# Patient Record
Sex: FEMALE | Race: WHITE | NOT HISPANIC OR LATINO
[De-identification: names, ages, dates, MRNs, and addresses within clinical notes are randomized per-mention and may not be internally consistent; named-entity substitution may affect disease eponyms.]

---
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
## 2020-01-03 ENCOUNTER — APPOINTMENT (OUTPATIENT)
Dept: OBGYN | Facility: CLINIC | Age: 20
End: 2020-01-03
Payer: COMMERCIAL

## 2020-01-03 VITALS — SYSTOLIC BLOOD PRESSURE: 110 MMHG | DIASTOLIC BLOOD PRESSURE: 70 MMHG

## 2020-01-03 PROCEDURE — 99213 OFFICE O/P EST LOW 20 MIN: CPT

## 2020-01-03 NOTE — CHIEF COMPLAINT
[Follow Up] : follow up GYN visit [FreeTextEntry1] : Pt here to discuss birth control. Had annual exam in August. She Was placed on ocp for ovarian cyst last year and had an allergic rxn to ocp.(Junel)\bonilla Saw gyn in August and was rx'd a different ocp but did not take because she was nervous about getting an allergic rxn.\bonilla Has multiple allergies to food.\bonilla Has painful menses, SA usues condoms.\par Also c/o pain in left breast, no lumps.

## 2020-01-03 NOTE — PHYSICAL EXAM
[Alert] : alert [Awake] : awake [Acute Distress] : no acute distress [Mass] : no breast mass [Axillary LAD] : no axillary lymphadenopathy [Nipple Discharge] : no nipple discharge [Soft] : soft [Tender] : non tender [Oriented x3] : oriented to person, place, and time [Normal] : uterus [No Bleeding] : there was no active vaginal bleeding [Tenderness] : nontender [Enlarged ___ wks] : not enlarged [Normal Position] : in a normal position [Uterine Adnexae] : were not tender and not enlarged [Adnexa Tenderness] : were not tender [Mass ___ cm] : no uterine mass was palpated [Ovarian Mass (___ Cm)] : there were no adnexal masses

## 2020-01-23 ENCOUNTER — TRANSCRIPTION ENCOUNTER (OUTPATIENT)
Age: 20
End: 2020-01-23

## 2020-01-28 NOTE — BEGINNING OF VISIT
Per provider verbal order, RN placed positive, negative controls, salmon, cod, tuna, corn and blue berry scratch tests on  Gian's back.  Consent was obtained prior to procedure.  Once panels were placed, patient was monitored for 15 minutes in clinic.  RN read test after 15 minutes and provider was notified of results.  Pt tolerated procedure well.  All questions and concerns were addressed at office visit.     Rakesh FRIED RN   Specialty Clinics    [Patient] : patient

## 2020-12-01 ENCOUNTER — TRANSCRIPTION ENCOUNTER (OUTPATIENT)
Age: 20
End: 2020-12-01

## 2021-08-19 ENCOUNTER — NON-APPOINTMENT (OUTPATIENT)
Age: 21
End: 2021-08-19

## 2021-08-19 ENCOUNTER — APPOINTMENT (OUTPATIENT)
Dept: OBGYN | Facility: CLINIC | Age: 21
End: 2021-08-19
Payer: MEDICARE

## 2021-08-19 VITALS
WEIGHT: 130 LBS | HEIGHT: 64 IN | BODY MASS INDEX: 22.2 KG/M2 | RESPIRATION RATE: 14 BRPM | DIASTOLIC BLOOD PRESSURE: 60 MMHG | SYSTOLIC BLOOD PRESSURE: 103 MMHG | HEART RATE: 74 BPM

## 2021-08-19 PROCEDURE — 99072 ADDL SUPL MATRL&STAF TM PHE: CPT

## 2021-08-19 PROCEDURE — 99395 PREV VISIT EST AGE 18-39: CPT

## 2021-08-19 NOTE — PLAN
[FreeTextEntry1] : PATIENT PRESENTS FOR ANNUAL VISIT. PATIENT DOING WELL.. SELF BREAST EXAM RECOMMENDED. DIET AND EXERCISE DISCUSSED. PATIENT ADVISED TO CONTACT WITH ANY CHANGES IN HEALTH. DISCUSSED CONTRACEPTION. WILL USE CONDOMS. SENIOR AT [facility]

## 2021-08-20 LAB
C TRACH RRNA SPEC QL NAA+PROBE: NOT DETECTED
N GONORRHOEA RRNA SPEC QL NAA+PROBE: NOT DETECTED
SOURCE TP AMPLIFICATION: NORMAL

## 2021-11-26 NOTE — HISTORY OF PRESENT ILLNESS
[Menarche ____ yo] : menarche at [unfilled] yo [Definite:  ___ (Date)] : the last menstrual period was [unfilled] [Menarche Age: ____] : age at menarche was [unfilled] [Sexually Active] : is sexually active [Monogamous] : is monogamous [Contraception] : uses contraception [Condoms] : uses condoms [Male ___] : [unfilled] male [Yes] : yes [Alert] : alert [No Acute Distress] : no acute distress [Normocephalic] : normocephalic [EOMI Bilateral] : EOMI bilateral [Clear tympanic membranes with bony landmarks and light reflex present bilaterally] : clear tympanic membranes with bony landmarks and light reflex present bilaterally  [Pink Nasal Mucosa] : pink nasal mucosa [Nonerythematous Oropharynx] : nonerythematous oropharynx [Supple, full passive range of motion] : supple, full passive range of motion [No Palpable Masses] : no palpable masses [Clear to Auscultation Bilaterally] : clear to auscultation bilaterally [Regular Rate and Rhythm] : regular rate and rhythm [Normal S1, S2 audible] : normal S1, S2 audible [No Murmurs] : no murmurs [+2 Femoral Pulses] : +2 femoral pulses [Soft] : soft [NonTender] : non tender [Non Distended] : non distended [Normoactive Bowel Sounds] : normoactive bowel sounds [No Hepatomegaly] : no hepatomegaly [No Splenomegaly] : no splenomegaly [Jameson: ____] : Jameson [unfilled] [Jameson: _____] : Jameson [unfilled] [No Abnormal Lymph Nodes Palpated] : no abnormal lymph nodes palpated [Normal Muscle Tone] : normal muscle tone [No Gait Asymmetry] : no gait asymmetry [No pain or deformities with palpation of bone, muscles, joints] : no pain or deformities with palpation of bone, muscles, joints [Straight] : straight [+2 Patella DTR] : +2 patella DTR [Cranial Nerves Grossly Intact] : cranial nerves grossly intact [No Rash or Lesions] : no rash or lesions [de-identified] : few papular lesions on feet and hands. small one inside nostril

## 2022-06-14 ENCOUNTER — APPOINTMENT (OUTPATIENT)
Dept: OBGYN | Facility: CLINIC | Age: 22
End: 2022-06-14

## 2022-06-14 VITALS
BODY MASS INDEX: 24.07 KG/M2 | WEIGHT: 141 LBS | SYSTOLIC BLOOD PRESSURE: 120 MMHG | DIASTOLIC BLOOD PRESSURE: 70 MMHG | HEIGHT: 64 IN

## 2022-06-14 DIAGNOSIS — Z30.9 ENCOUNTER FOR CONTRACEPTIVE MANAGEMENT, UNSPECIFIED: ICD-10-CM

## 2022-06-14 DIAGNOSIS — Z01.419 ENCOUNTER FOR GYNECOLOGICAL EXAMINATION (GENERAL) (ROUTINE) W/OUT ABNORMAL FINDINGS: ICD-10-CM

## 2022-06-14 DIAGNOSIS — Z30.09 ENCOUNTER FOR OTHER GENERAL COUNSELING AND ADVICE ON CONTRACEPTION: ICD-10-CM

## 2022-06-14 LAB
HCG UR QL: NEGATIVE
QUALITY CONTROL: YES

## 2022-06-14 PROCEDURE — 99072 ADDL SUPL MATRL&STAF TM PHE: CPT

## 2022-06-14 PROCEDURE — 99213 OFFICE O/P EST LOW 20 MIN: CPT

## 2022-06-14 PROCEDURE — 81025 URINE PREGNANCY TEST: CPT

## 2022-06-14 RX ORDER — EPINEPHRINE 0.3 MG/.3ML
0.3 INJECTION INTRAMUSCULAR
Qty: 2 | Refills: 0 | Status: DISCONTINUED | COMMUNITY
Start: 2019-07-22 | End: 2022-06-14

## 2022-06-14 RX ORDER — ALBUTEROL SULFATE 108 UG/1
108 (90 BASE) AEROSOL, METERED RESPIRATORY (INHALATION)
Qty: 7 | Refills: 0 | Status: DISCONTINUED | COMMUNITY
Start: 2019-03-22 | End: 2022-06-14

## 2022-06-14 RX ORDER — DESOGESTREL/ETHINYL ESTRADIOL AND ETHINYL ESTRADIOL 21-5 (28)
0.15-0.02/0.01 KIT ORAL DAILY
Qty: 3 | Refills: 3 | Status: DISCONTINUED | COMMUNITY
Start: 2019-08-17 | End: 2022-06-14

## 2022-06-14 RX ORDER — BETAMETHASONE DIPROPIONATE 0.5 MG/G
0.05 OINTMENT, AUGMENTED TOPICAL
Qty: 1 | Refills: 1 | Status: DISCONTINUED | COMMUNITY
Start: 2019-08-06 | End: 2022-06-14

## 2022-08-15 NOTE — DISCUSSION/SUMMARY
[FreeTextEntry1] : Benign breast exam. No lumps or masses felt. \par \par Contraceptive options were discussed including OCPs, Depo-Provera, NuvaRing, and IUD. She desires NuvaRing to try for now. R/b/a were discussed. Risks discussed include but are not limited to headache, mood swings, breast tenderness, weight gain, menstrual cycle changes, acne, ovarian cysts, and blood clots. ParaGard IUD pamphlet given. Prescription for NuvaRing given. Instructions discussed. \par \par She will follow up in three months for annual and BC check or as needed. \par \par

## 2022-08-15 NOTE — END OF VISIT
[FreeTextEntry3] : I, Cindy Skaggs solely acted as scribe for Dr. Brianna Hahn on 06/14/2022 \par All medical entries made by the Scribe were at my, Dr. Hahn’s, direction and personally\par dictated by me on 06/14/2022 . I have reviewed the chart and agree that the record\par accurately reflects my personal performance of the history, physical exam, assessment and plan. I\par have also personally directed, reviewed, and agreed with the chart.

## 2022-08-15 NOTE — HISTORY OF PRESENT ILLNESS
[Menarche Age: ____] : age at menarche was [unfilled] [No] : Patient does not have concerns regarding sex [N] : Patient does not use contraception [Y] : Patient is sexually active [Currently Active] : currently active [PGHxTotal] : 0 [LMPDate] : 5/23/22 [FreeTextEntry1] : 5/23/22

## 2022-08-25 ENCOUNTER — APPOINTMENT (OUTPATIENT)
Dept: OBGYN | Facility: CLINIC | Age: 22
End: 2022-08-25

## 2022-09-16 ENCOUNTER — NON-APPOINTMENT (OUTPATIENT)
Age: 22
End: 2022-09-16

## 2022-09-16 ENCOUNTER — APPOINTMENT (OUTPATIENT)
Dept: DERMATOLOGY | Facility: CLINIC | Age: 22
End: 2022-09-16

## 2022-09-16 VITALS — WEIGHT: 130 LBS | HEIGHT: 64 IN | BODY MASS INDEX: 22.2 KG/M2

## 2022-09-16 PROCEDURE — 99072 ADDL SUPL MATRL&STAF TM PHE: CPT

## 2022-09-16 PROCEDURE — 99204 OFFICE O/P NEW MOD 45 MIN: CPT

## 2022-09-16 RX ORDER — ETONOGESTREL AND ETHINYL ESTRADIOL 11.7; 2.7 MG/1; MG/1
0.12-0.015 INSERT, EXTENDED RELEASE VAGINAL
Qty: 3 | Refills: 0 | Status: DISCONTINUED | COMMUNITY
Start: 2022-06-14 | End: 2022-09-16

## 2022-09-16 NOTE — PHYSICAL EXAM
[FreeTextEntry3] : Erythematous scaling patches with inflammation\par b/l forearms, antecubitals\par also L upper eyelid

## 2022-09-16 NOTE — ASSESSMENT
[FreeTextEntry1] : Atopic Derm; exacerbated by work environment recently; \par \par Therapeutic options and their risks and benefits; along with multiple diagnostic possibilities were discussed at length; risks and benefits of further study were discussed;\par \par betamethasone AF cream BID;  1-2 weeks (sparingly on eyelids)

## 2022-09-16 NOTE — HISTORY OF PRESENT ILLNESS
[de-identified] : pt. c/o itching on arms;  worse last few weeks;\par has Hx of eczema in childhood\par used TAC ointment in the past;

## 2022-10-05 ENCOUNTER — NON-APPOINTMENT (OUTPATIENT)
Age: 22
End: 2022-10-05

## 2022-10-05 ENCOUNTER — APPOINTMENT (OUTPATIENT)
Dept: OBGYN | Facility: CLINIC | Age: 22
End: 2022-10-05

## 2022-10-05 VITALS
HEIGHT: 64 IN | DIASTOLIC BLOOD PRESSURE: 60 MMHG | BODY MASS INDEX: 21.34 KG/M2 | SYSTOLIC BLOOD PRESSURE: 100 MMHG | WEIGHT: 125 LBS

## 2022-10-05 DIAGNOSIS — Z83.3 FAMILY HISTORY OF DIABETES MELLITUS: ICD-10-CM

## 2022-10-05 PROCEDURE — 99395 PREV VISIT EST AGE 18-39: CPT

## 2022-10-05 PROCEDURE — 99072 ADDL SUPL MATRL&STAF TM PHE: CPT

## 2022-10-09 NOTE — END OF VISIT
[FreeTextEntry3] : I, Cindy Skaggs solely acted as scribe for Dr. Brianna Hahn on 10/05/2022 \par All medical entries made by the Scribe were at my, Dr. Hahn’s, direction and personally\par dictated by me on 10/05/2022 . I have reviewed the chart and agree that the record\par accurately reflects my personal performance of the history, physical exam, assessment and plan. I\par have also personally directed, reviewed, and agreed with the chart.

## 2022-10-09 NOTE — HISTORY OF PRESENT ILLNESS
[Gonorrhea test offered] : Gonorrhea test offered [Chlamydia test offered] : Chlamydia test offered [Condoms] : uses condoms [Y] : Patient is sexually active [N] : Patient denies prior pregnancies [Menarche Age: ____] : age at menarche was [unfilled] [Currently Active] : currently active [Men] : men [PapSmeardate] : 08/19/2021 [TextBox_31] : Negative [GonorrheaDate] : 06/14/2022 [TextBox_63] : Negative [ChlamydiaDate] : 06/14/2022 [TextBox_68] : Negative [LMPDate] : 09/09/2022 [FreeTextEntry1] : 09/09/2022

## 2022-10-09 NOTE — PHYSICAL EXAM
[Chaperone Present] : A chaperone was present in the examining room during all aspects of the physical examination [Appropriately responsive] : appropriately responsive [Alert] : alert [No Acute Distress] : no acute distress [Soft] : soft [Non-tender] : non-tender [Non-distended] : non-distended [Oriented x3] : oriented x3 [Examination Of The Breasts] : a normal appearance [No Discharge] : no discharge [No Masses] : no breast masses were palpable [Labia Majora] : normal [Labia Minora] : normal [No Bleeding] : There was no active vaginal bleeding [Normal] : normal [Uterine Adnexae] : normal [FreeTextEntry1] : MOA: Nicole NAQVI

## 2022-10-10 LAB
C TRACH RRNA SPEC QL NAA+PROBE: NOT DETECTED
CYTOLOGY CVX/VAG DOC THIN PREP: NORMAL
HPV HIGH+LOW RISK DNA PNL CVX: NOT DETECTED
N GONORRHOEA RRNA SPEC QL NAA+PROBE: NOT DETECTED
SOURCE AMPLIFICATION: NORMAL
SOURCE TP AMPLIFICATION: NORMAL
T VAGINALIS RRNA SPEC QL NAA+PROBE: NOT DETECTED

## 2022-10-15 ENCOUNTER — APPOINTMENT (OUTPATIENT)
Dept: OBGYN | Facility: CLINIC | Age: 22
End: 2022-10-15

## 2023-02-23 ENCOUNTER — APPOINTMENT (OUTPATIENT)
Dept: DERMATOLOGY | Facility: CLINIC | Age: 23
End: 2023-02-23
Payer: COMMERCIAL

## 2023-02-23 PROCEDURE — 99072 ADDL SUPL MATRL&STAF TM PHE: CPT

## 2023-02-23 PROCEDURE — 99213 OFFICE O/P EST LOW 20 MIN: CPT

## 2023-02-23 NOTE — ASSESSMENT
[FreeTextEntry1] : Cyst;  possible PMX\par small, no suspicious features; \par \par Therapeutic options and their risks and benefits; along with multiple diagnostic possibilities were discussed at length; risks and benefits of further study were discussed;\par \par nature explained, No treatment is required unless this lesion becomes symptomatic.

## 2023-02-23 NOTE — PHYSICAL EXAM
[FreeTextEntry3] : Mobile subcutaneous nodule with normal overlying skin and a connecting pore \par Right posterior shoulder; \par non-tender; firm

## 2023-02-23 NOTE — HISTORY OF PRESENT ILLNESS
[de-identified] : The patient has been fit in for urgent appointment.\par c/o lesion on back;  present approx 1 year;  no Sxs;

## 2023-03-02 ENCOUNTER — APPOINTMENT (OUTPATIENT)
Dept: OBGYN | Facility: CLINIC | Age: 23
End: 2023-03-02
Payer: COMMERCIAL

## 2023-03-02 VITALS
BODY MASS INDEX: 23.73 KG/M2 | WEIGHT: 139 LBS | SYSTOLIC BLOOD PRESSURE: 102 MMHG | DIASTOLIC BLOOD PRESSURE: 60 MMHG | HEIGHT: 64 IN

## 2023-03-02 LAB
BILIRUB UR QL STRIP: NORMAL
GLUCOSE UR-MCNC: NORMAL
HCG UR QL: 0.2 EU/DL
HCG UR QL: NEGATIVE
HGB UR QL STRIP.AUTO: NORMAL
KETONES UR-MCNC: NORMAL
LEUKOCYTE ESTERASE UR QL STRIP: NORMAL
NITRITE UR QL STRIP: NORMAL
PH UR STRIP: 6
PROT UR STRIP-MCNC: NORMAL
QUALITY CONTROL: YES
SP GR UR STRIP: 1.01

## 2023-03-02 PROCEDURE — 99072 ADDL SUPL MATRL&STAF TM PHE: CPT

## 2023-03-02 PROCEDURE — 99213 OFFICE O/P EST LOW 20 MIN: CPT

## 2023-03-02 PROCEDURE — 81025 URINE PREGNANCY TEST: CPT

## 2023-03-02 PROCEDURE — 81003 URINALYSIS AUTO W/O SCOPE: CPT | Mod: QW

## 2023-03-02 NOTE — PHYSICAL EXAM
[Chaperone Present] : A chaperone was present in the examining room during all aspects of the physical examination [Appropriately responsive] : appropriately responsive [Alert] : alert [No Acute Distress] : no acute distress [Soft] : soft [Non-tender] : non-tender [Non-distended] : non-distended [No Mass] : no mass [Oriented x3] : oriented x3 [Labia Majora] : normal [Labia Minora] : normal [Normal] : normal [Uterine Adnexae] : non-palpable [FreeTextEntry1] : GRICELDA Rodrigues  [FreeTextEntry7] : no CVAT [Tenderness] : nontender [FreeTextEntry5] : Very scant blood at the os, no CMT

## 2023-03-02 NOTE — HISTORY OF PRESENT ILLNESS
[Condoms] : uses condoms [Y] : Patient is sexually active [N] : Patient denies prior pregnancies [Menarche Age: ____] : age at menarche was [unfilled] [Currently Active] : currently active [No] : Patient does not have concerns regarding sex [PapSmeardate] : 10/05/22 [TextBox_31] : NEG [GonorrheaDate] : 10/05/22 [TextBox_63] : NEG [ChlamydiaDate] : 10/05/22 [TextBox_68] : NEG [TrichomonasDate] : 10/05/22 [TextBox_73] : NEG [HPVDate] : 10/05/22 [TextBox_78] : NEG [LMPDate] : 2/21/23 [PGHxTotal] : 0 [FreeTextEntry1] : 2/21/23

## 2023-03-02 NOTE — PLAN
[FreeTextEntry1] : -We reviewed her benign exam today, no reproducible pain \par -F/u GC/chlamydia culture\par -Monitor symptoms, if discomfort or spotting persists, return for TVUS next week\par -If worsening pain, heavy bleeding, N/V, fever/chills, she is aware she needs ER evaluation \par -Call or RTO PRN for any new problems, questions or concerns

## 2023-03-07 ENCOUNTER — ASOB RESULT (OUTPATIENT)
Age: 23
End: 2023-03-07

## 2023-03-07 ENCOUNTER — APPOINTMENT (OUTPATIENT)
Dept: OBGYN | Facility: CLINIC | Age: 23
End: 2023-03-07
Payer: MEDICARE

## 2023-03-07 ENCOUNTER — APPOINTMENT (OUTPATIENT)
Dept: ANTEPARTUM | Facility: CLINIC | Age: 23
End: 2023-03-07
Payer: COMMERCIAL

## 2023-03-07 VITALS
WEIGHT: 139 LBS | HEIGHT: 64 IN | SYSTOLIC BLOOD PRESSURE: 108 MMHG | TEMPERATURE: 97 F | DIASTOLIC BLOOD PRESSURE: 64 MMHG | BODY MASS INDEX: 23.73 KG/M2

## 2023-03-07 PROCEDURE — 76830 TRANSVAGINAL US NON-OB: CPT

## 2023-03-07 PROCEDURE — 99072 ADDL SUPL MATRL&STAF TM PHE: CPT

## 2023-03-07 PROCEDURE — 99212 OFFICE O/P EST SF 10 MIN: CPT

## 2023-03-10 ENCOUNTER — TRANSCRIPTION ENCOUNTER (OUTPATIENT)
Age: 23
End: 2023-03-10

## 2023-03-10 ENCOUNTER — NON-APPOINTMENT (OUTPATIENT)
Age: 23
End: 2023-03-10

## 2023-03-22 ENCOUNTER — APPOINTMENT (OUTPATIENT)
Dept: MRI IMAGING | Facility: CLINIC | Age: 23
End: 2023-03-22
Payer: COMMERCIAL

## 2023-03-22 ENCOUNTER — OUTPATIENT (OUTPATIENT)
Dept: OUTPATIENT SERVICES | Facility: HOSPITAL | Age: 23
LOS: 1 days | End: 2023-03-22
Payer: MEDICARE

## 2023-03-22 DIAGNOSIS — Q51.4 UNICORNATE UTERUS: ICD-10-CM

## 2023-03-22 PROCEDURE — 72197 MRI PELVIS W/O & W/DYE: CPT

## 2023-03-22 PROCEDURE — 72197 MRI PELVIS W/O & W/DYE: CPT | Mod: 26

## 2023-03-22 PROCEDURE — A9585: CPT

## 2023-03-24 ENCOUNTER — APPOINTMENT (OUTPATIENT)
Dept: OBGYN | Facility: CLINIC | Age: 23
End: 2023-03-24
Payer: MEDICARE

## 2023-03-24 VITALS
HEIGHT: 64 IN | SYSTOLIC BLOOD PRESSURE: 104 MMHG | BODY MASS INDEX: 23.79 KG/M2 | WEIGHT: 139.38 LBS | DIASTOLIC BLOOD PRESSURE: 66 MMHG

## 2023-03-24 PROCEDURE — 99214 OFFICE O/P EST MOD 30 MIN: CPT

## 2023-03-26 NOTE — DISCUSSION/SUMMARY
[FreeTextEntry1] : 21 y/o G0 LMP 3/10 presenting for episode of irregular menses, hx of ovarian cysts, and concern for unicornuate uterus. MRI on 3/22 showed normal uterus with no mullerian anomalies, no ovarian cysts.\par - Counseled that one episode of light bleeding a few days before menses can occur especially in context of cysts and can continue to be monitored, will likely not occur again. Advised if it is recurrent, to call office for follow up and further work up.\par - Counseled on MRI result sand images reviewed with patient. Advised uterus is normal and there are no current ovarian cysts. Patient had concern for future pregnancies, advised that given uterus is normal there would be no increased concern or risks at this time.\par - Reviewed possible allergy to Junel. Reviewed when it comes to cysts, combined OCPs, patches, or vaginal ring are only options that help prevent them. Extensively discussed different options, and differences in E2 dosing and types of progesterone. ADvised it may be possible  that a different pill would work better for her and not cause a reaction, but that continuing to observe without pills as cysts come and go, managing pain with ibuprofen as needed, is also an option. Patient choosing to continue to observe at this time.\par - Return for routine visits or as needed, no further intervention at this time.\par \par Pietro Mary MD

## 2023-03-26 NOTE — DISCUSSION/SUMMARY
[FreeTextEntry1] : Pt to RTO for f/u TVS in 9-12 weeks, f/u ovarian cyst sooner if needed.  Differentials regarding possible unicornuate uterus reviewed - discussed not likely due to bilateral ovaries, tubes look normal.  All the pt's questions and concerns were addressed,

## 2023-03-26 NOTE — HISTORY OF PRESENT ILLNESS
[Condoms] : uses condoms [Y] : Patient is sexually active [N] : Patient denies prior pregnancies [Menarche Age: ____] : age at menarche was [unfilled] [No] : Patient does not have concerns regarding sex [Currently Active] : currently active [PapSmeardate] : 10/05/22 [GonorrheaDate] : 03/02/23 [TextBox_31] : NEG [TextBox_63] : NEG [TextBox_68] : NEG [ChlamydiaDate] : 03/02/23 [TrichomonasDate] : 10/05/22 [TextBox_73] : NEG [HPVDate] : 10/05/22 [TextBox_78] : NEG [LMPDate] : 02/21/23 [PGHxTotal] : 0 [FreeTextEntry1] : 02/21/23

## 2023-03-26 NOTE — HISTORY OF PRESENT ILLNESS
[N] : Patient denies prior pregnancies [Menarche Age: ____] : age at menarche was [unfilled] [No] : Patient does not have concerns regarding sex [PapSmeardate] : 10/05/22 [TextBox_31] : NEG [BoneDensityDate] : N [GonorrheaDate] : 03/02/23 [TextBox_63] : NEG [ChlamydiaDate] : 03/02/23 [TextBox_68] : NEG [LMPDate] : 03/10/23 [PGHxTotal] : 0 [FreeTextEntry1] : 03/10/23

## 2023-03-27 ENCOUNTER — NON-APPOINTMENT (OUTPATIENT)
Age: 23
End: 2023-03-27

## 2023-04-18 ENCOUNTER — OUTPATIENT (OUTPATIENT)
Dept: OUTPATIENT SERVICES | Facility: HOSPITAL | Age: 23
LOS: 1 days | End: 2023-04-18
Payer: MEDICARE

## 2023-04-18 ENCOUNTER — APPOINTMENT (OUTPATIENT)
Dept: RADIOLOGY | Facility: HOSPITAL | Age: 23
End: 2023-04-18

## 2023-04-18 DIAGNOSIS — Q51.4 UNICORNATE UTERUS: ICD-10-CM

## 2023-04-18 PROCEDURE — 58340 CATHETER FOR HYSTEROGRAPHY: CPT

## 2023-04-18 PROCEDURE — 74740 X-RAY FEMALE GENITAL TRACT: CPT | Mod: 26

## 2023-04-18 PROCEDURE — 74740 X-RAY FEMALE GENITAL TRACT: CPT

## 2023-04-19 ENCOUNTER — TRANSCRIPTION ENCOUNTER (OUTPATIENT)
Age: 23
End: 2023-04-19

## 2023-04-21 ENCOUNTER — TRANSCRIPTION ENCOUNTER (OUTPATIENT)
Age: 23
End: 2023-04-21

## 2023-04-24 ENCOUNTER — TRANSCRIPTION ENCOUNTER (OUTPATIENT)
Age: 23
End: 2023-04-24

## 2023-04-24 DIAGNOSIS — R30.0 DYSURIA: ICD-10-CM

## 2023-05-23 ENCOUNTER — APPOINTMENT (OUTPATIENT)
Dept: DERMATOLOGY | Facility: CLINIC | Age: 23
End: 2023-05-23
Payer: SELF-PAY

## 2023-05-23 PROCEDURE — D0090: CPT

## 2023-05-30 ENCOUNTER — APPOINTMENT (OUTPATIENT)
Dept: ANTEPARTUM | Facility: CLINIC | Age: 23
End: 2023-05-30

## 2023-06-29 ENCOUNTER — APPOINTMENT (OUTPATIENT)
Dept: MATERNAL FETAL MEDICINE | Facility: CLINIC | Age: 23
End: 2023-06-29
Payer: SELF-PAY

## 2023-06-29 ENCOUNTER — APPOINTMENT (OUTPATIENT)
Dept: ANTEPARTUM | Facility: CLINIC | Age: 23
End: 2023-06-29

## 2023-06-29 ENCOUNTER — NON-APPOINTMENT (OUTPATIENT)
Age: 23
End: 2023-06-29

## 2023-06-29 VITALS
HEART RATE: 69 BPM | SYSTOLIC BLOOD PRESSURE: 102 MMHG | BODY MASS INDEX: 24.24 KG/M2 | RESPIRATION RATE: 18 BRPM | OXYGEN SATURATION: 98 % | DIASTOLIC BLOOD PRESSURE: 80 MMHG | WEIGHT: 142 LBS | HEIGHT: 64 IN

## 2023-06-29 DIAGNOSIS — Q51.4 UNICORNATE UTERUS: ICD-10-CM

## 2023-06-29 DIAGNOSIS — Z87.09 PERSONAL HISTORY OF OTHER DISEASES OF THE RESPIRATORY SYSTEM: ICD-10-CM

## 2023-06-29 PROCEDURE — 99204 OFFICE O/P NEW MOD 45 MIN: CPT

## 2023-06-29 RX ORDER — BETAMETHASONE DIPROPIONATE 0.5 MG/G
0.05 CREAM, AUGMENTED TOPICAL
Qty: 1 | Refills: 3 | Status: DISCONTINUED | COMMUNITY
Start: 2022-09-16 | End: 2023-06-29

## 2023-06-29 NOTE — HISTORY OF PRESENT ILLNESS
[FreeTextEntry1] : Marina Richard is a 22 y.o. G0 who presents for preconception consultation secondary to newly diagnosed unicornuate uterus.  Her BMI is 24.37 kg/m2.  Marina had pelvis ultrasound performed on 3/7/23 secondary to complaint of pelvic pain.  Right ovarian cysts were noted and a unicornuate uterus was suspected.  She was sent for MRI for further evaluation which reported a unicornuate uterus without renal anomy.  HSG confirmed a unicornuate uterus with patent left fallopian tube.  Marina does not have any plan for conception at this time.  She presents today with her mother to discuss how this may affect a future pregnancy.  On the day of consultation, she feels well and report on constitutional complaint.

## 2023-06-29 NOTE — DATA REVIEWED
[FreeTextEntry1] : TAUS / TVUS 3/7/2 - homogenous uterine myometrium, suspected unicornuate uterus, endometrial lining 6.1 mm, left ovary sonographically normal, right ovary with 4.4 x 2.9 x 3.6 cm hemorrhagic cyst and multiple additional simple cysts.\par \par MRI 3/22/23 - unicornuate uterus, no renal anomaly\par \par HSG 4/18/23 - small unicornuate uterus with patent left fallopian tube, no filling defect noted

## 2023-06-29 NOTE — DISCUSSION/SUMMARY
[FreeTextEntry1] : At the time of consultation, we reviewed that congenital anomalies of the uterus are often asymptomatic and therefore unrecognized.  It is fortunate the diagnosis of unicornuate uterus was made prior to pregnancy so that potential complications may be addressed and managed. \par \par A  unicornuate uterus occurs when the two mullerian ducts fail to fuse symmetrically.  One cavity is usually normal, with a fallopian tube and cervix, while the failed mullerian duct has various configurations.   Unicornuate uteri have been reported to occur in approximately 1:4000 women in the general population; however, it is more commonly diagnosed in women with a history of infertility.  Renal anomalies have been reported in up to 40% women with unicornuate uteri; therefore, all women with mullerian defects should undergo a radiologic renal investigation. Marina was reassured that both kidneys were visualized and appeared normal on MRI.  \par \par Obstetrical complications reported to occur more commonly with uterine anomalies include increased risks of miscarriage (25% first trimester, 10% second trimester), prematurity (20%), fetal growth restriction, stillbirth (10%), antepartum and postpartum bleeding, cervical insufficiency, abnormal fetal presentation, hypertensive disorder of pregnancy, and  delivery.  Growth restriction may be related to a small uterine cavity or abnormal vascularization of the uterus, with resultant uteroplacental insufficiency.  The increased risk of malpresentation is the main reason for the increased risk of  delivery.  Hypertensive disorders have been attributed to coexistent congenital renal abnormalities.   The live birth rate in women with unicornuate uteri is reported as 50%. For completion, we discussed the option of IVF with gestational carrier.  All questions were answered to the best of my ability. \par \par At the time of future pregnancy, I would recommend early ultrasound confirmation of IUP, with baseline cervical length evaluation at 16 weeks and continued serial ultrasound surveillance of cervical length.  I would also recommend serial ultrasound evaluation of fetal growth and careful monitoring of signs/symptoms of preeclampsia and  labor. Timing and route of delivery will be dependent on clinical course and results of antepartum evaluation. \par \par While Marina has not plan to conceive in the near future, we reviewed general preconception recommendations. She was advised to begin prenatal vitamin supplementation with at least 400 mcg of folic acid in the 2-3 months prior to attempt at conception. She was counseled to avoid toxic exposures (smoke, alcohol, infections) and make sure vaccinations and health maintenance examinations are up to date.  She was offered genetic consultation for carrier screening and declines evaluation until her timeline for family planning is more definitive. Healthy diet and lifestyle recommendations were reviewed.

## 2023-06-29 NOTE — FAMILY HISTORY
[Age 35+ During Pregnancy] : not 35 or over during pregnancy [Reported Family History Of Birth Defects] : no congenital heart defects [Kye-Sachs Carrier] : no Kye-Sachs [Family History] : no mental retardation/autism [Reported Family History Of Genetic Disease] : no history of child defect in child of baby father

## 2023-09-12 ENCOUNTER — NON-APPOINTMENT (OUTPATIENT)
Age: 23
End: 2023-09-12

## 2023-09-12 ENCOUNTER — APPOINTMENT (OUTPATIENT)
Dept: ULTRASOUND IMAGING | Facility: CLINIC | Age: 23
End: 2023-09-12
Payer: MEDICARE

## 2023-09-12 PROCEDURE — 76536 US EXAM OF HEAD AND NECK: CPT | Mod: 26

## 2023-09-18 ENCOUNTER — APPOINTMENT (OUTPATIENT)
Dept: ORTHOPEDIC SURGERY | Facility: CLINIC | Age: 23
End: 2023-09-18

## 2023-10-05 ENCOUNTER — NON-APPOINTMENT (OUTPATIENT)
Age: 23
End: 2023-10-05

## 2023-10-05 ENCOUNTER — APPOINTMENT (OUTPATIENT)
Dept: PEDIATRIC ALLERGY IMMUNOLOGY | Facility: CLINIC | Age: 23
End: 2023-10-05
Payer: MEDICARE

## 2023-10-05 VITALS
BODY MASS INDEX: 23.05 KG/M2 | WEIGHT: 135 LBS | OXYGEN SATURATION: 97 % | DIASTOLIC BLOOD PRESSURE: 69 MMHG | TEMPERATURE: 97.9 F | HEART RATE: 82 BPM | HEIGHT: 64 IN | SYSTOLIC BLOOD PRESSURE: 113 MMHG

## 2023-10-05 DIAGNOSIS — R79.89 OTHER SPECIFIED ABNORMAL FINDINGS OF BLOOD CHEMISTRY: ICD-10-CM

## 2023-10-05 DIAGNOSIS — Z91.010 ALLERGY TO PEANUTS: ICD-10-CM

## 2023-10-05 DIAGNOSIS — J45.990 EXERCISE INDUCED BRONCHOSPASM: ICD-10-CM

## 2023-10-05 DIAGNOSIS — Z82.5 FAMILY HISTORY OF ASTHMA AND OTHER CHRONIC LOWER RESPIRATORY DISEASES: ICD-10-CM

## 2023-10-05 DIAGNOSIS — L20.9 ATOPIC DERMATITIS, UNSPECIFIED: ICD-10-CM

## 2023-10-05 DIAGNOSIS — Z91.013 ALLERGY TO SEAFOOD: ICD-10-CM

## 2023-10-05 DIAGNOSIS — T78.05XA ANAPHYLACTIC REACTION DUE TO TREE NUTS AND SEEDS, INITIAL ENCOUNTER: ICD-10-CM

## 2023-10-05 PROCEDURE — 99204 OFFICE O/P NEW MOD 45 MIN: CPT | Mod: 25

## 2023-10-05 PROCEDURE — 94010 BREATHING CAPACITY TEST: CPT

## 2023-10-05 RX ORDER — EPINEPHRINE 0.3 MG/.3ML
0.3 INJECTION INTRAMUSCULAR
Qty: 4 | Refills: 2 | Status: ACTIVE | COMMUNITY
Start: 2023-10-05 | End: 1900-01-01

## 2023-10-12 ENCOUNTER — NON-APPOINTMENT (OUTPATIENT)
Age: 23
End: 2023-10-12

## 2023-10-18 ENCOUNTER — APPOINTMENT (OUTPATIENT)
Dept: PEDIATRIC ALLERGY IMMUNOLOGY | Facility: CLINIC | Age: 23
End: 2023-10-18
Payer: SELF-PAY

## 2023-10-18 VITALS
SYSTOLIC BLOOD PRESSURE: 120 MMHG | DIASTOLIC BLOOD PRESSURE: 82 MMHG | OXYGEN SATURATION: 100 % | HEART RATE: 64 BPM | TEMPERATURE: 98.2 F

## 2023-10-18 DIAGNOSIS — T78.05XD ANAPHYLACTIC REACTION DUE TO TREE NUTS AND SEEDS, SUBSEQUENT ENCOUNTER: ICD-10-CM

## 2023-10-18 DIAGNOSIS — Z91.013 ALLERGY TO SEAFOOD: ICD-10-CM

## 2023-10-18 DIAGNOSIS — J45.20 MILD INTERMITTENT ASTHMA, UNCOMPLICATED: ICD-10-CM

## 2023-10-18 PROCEDURE — 99213 OFFICE O/P EST LOW 20 MIN: CPT | Mod: 25

## 2023-10-18 PROCEDURE — 95004 PERQ TESTS W/ALRGNC XTRCS: CPT

## 2023-11-28 ENCOUNTER — APPOINTMENT (OUTPATIENT)
Dept: DERMATOLOGY | Facility: CLINIC | Age: 23
End: 2023-11-28
Payer: SELF-PAY

## 2023-11-28 PROCEDURE — D0090: CPT

## 2024-03-07 ENCOUNTER — APPOINTMENT (OUTPATIENT)
Dept: OBGYN | Facility: CLINIC | Age: 24
End: 2024-03-07
Payer: COMMERCIAL

## 2024-03-07 VITALS
SYSTOLIC BLOOD PRESSURE: 112 MMHG | DIASTOLIC BLOOD PRESSURE: 68 MMHG | BODY MASS INDEX: 23.05 KG/M2 | HEIGHT: 64 IN | WEIGHT: 135 LBS | TEMPERATURE: 98 F

## 2024-03-07 DIAGNOSIS — Z01.419 ENCOUNTER FOR GYNECOLOGICAL EXAMINATION (GENERAL) (ROUTINE) W/OUT ABNORMAL FINDINGS: ICD-10-CM

## 2024-03-07 DIAGNOSIS — Z11.8 ENCOUNTER FOR SCREENING FOR OTHER INFECTIOUS AND PARASITIC DISEASES: ICD-10-CM

## 2024-03-07 DIAGNOSIS — Z87.42 PERSONAL HISTORY OF OTHER DISEASES OF THE FEMALE GENITAL TRACT: ICD-10-CM

## 2024-03-07 DIAGNOSIS — N83.201 UNSPECIFIED OVARIAN CYST, RIGHT SIDE: ICD-10-CM

## 2024-03-07 DIAGNOSIS — Z31.69 ENCOUNTER FOR OTHER GENERAL COUNSELING AND ADVICE ON PROCREATION: ICD-10-CM

## 2024-03-07 DIAGNOSIS — Z41.1 ENCOUNTER FOR COSMETIC SURGERY: ICD-10-CM

## 2024-03-07 DIAGNOSIS — Z91.018 ALLERGY TO OTHER FOODS: ICD-10-CM

## 2024-03-07 DIAGNOSIS — N64.4 MASTODYNIA: ICD-10-CM

## 2024-03-07 DIAGNOSIS — Z11.3 ENCOUNTER FOR SCREENING FOR INFECTIONS WITH A PREDOMINANTLY SEXUAL MODE OF TRANSMISSION: ICD-10-CM

## 2024-03-07 DIAGNOSIS — J30.81 ALLERGIC RHINITIS DUE TO ANIMAL (CAT) (DOG) HAIR AND DANDER: ICD-10-CM

## 2024-03-07 DIAGNOSIS — Z87.2 PERSONAL HISTORY OF DISEASES OF THE SKIN AND SUBCUTANEOUS TISSUE: ICD-10-CM

## 2024-03-07 LAB
HCG UR QL: NEGATIVE
QUALITY CONTROL: YES

## 2024-03-07 PROCEDURE — 81025 URINE PREGNANCY TEST: CPT

## 2024-03-07 PROCEDURE — 99395 PREV VISIT EST AGE 18-39: CPT

## 2024-03-07 NOTE — PHYSICAL EXAM
[Chaperone Present] : A chaperone was present in the examining room during all aspects of the physical examination [Appropriately responsive] : appropriately responsive [Alert] : alert [No Acute Distress] : no acute distress [Soft] : soft [Non-tender] : non-tender [Non-distended] : non-distended [Oriented x3] : oriented x3 [Examination Of The Breasts] : a normal appearance [No Discharge] : no discharge [No Masses] : no breast masses were palpable [Labia Majora] : normal [Labia Minora] : normal [Uterine Adnexae] : normal [Normal] : normal

## 2024-03-07 NOTE — COUNSELING
[Breast Self Exam] : breast self exam [Contraception/ Emergency Contraception/ Safe Sexual Practices] : contraception, emergency contraception, safe sexual practices [HPV Vaccine] : HPV Vaccine [STD (testing, results, tx)] : STD (testing, results, tx)

## 2024-03-10 PROBLEM — N64.4 BREAST PAIN, LEFT: Status: RESOLVED | Noted: 2020-01-03 | Resolved: 2024-03-10

## 2024-03-10 LAB
C TRACH RRNA SPEC QL NAA+PROBE: NOT DETECTED
N GONORRHOEA RRNA SPEC QL NAA+PROBE: NOT DETECTED
SOURCE AMPLIFICATION: NORMAL
SOURCE TP AMPLIFICATION: NORMAL
T VAGINALIS RRNA SPEC QL NAA+PROBE: NOT DETECTED

## 2024-03-10 NOTE — PLAN
[FreeTextEntry1] : Pap done today.   Declines full STI testing as well as all birth control options.   Self-breast exam reviewed.  She will follow up annually and as needed.

## 2024-03-10 NOTE — END OF VISIT
[FreeTextEntry3] : I, Cindy Skaggs solely acted as scribe for Dr. Brianna Hahn on 03/07/2024  All medical entries made by the Scribe were at my, Dr. Ortiz, direction and personally dictated by me on 03/07/2024 . I have reviewed the chart and agree that the record accurately reflects my personal performance of the history, physical exam, assessment and plan. I have also personally directed, reviewed, and agreed with the chart.

## 2024-03-10 NOTE — HISTORY OF PRESENT ILLNESS
Med Reconciliation [Y] : Patient is sexually active [N] : Patient denies prior pregnancies [Menarche Age: ____] : age at menarche was [unfilled] [No] : Patient does not have concerns regarding sex [Men] : men [Currently Active] : currently active [PapSmeardate] : 10/05/22 [GonorrheaDate] : 03/02/23 [TextBox_31] : NEG [ChlamydiaDate] : 03/02/23 [TextBox_68] : NEG [TextBox_63] : NEG [TextBox_73] : NEG [TrichomonasDate] : 10/05/22 [TextBox_78] : NEG [HPVDate] : 10/05/22 [LMPDate] : 03/01/24 [FreeTextEntry1] : 03/01/24 [PGHxTotal] : 0

## 2024-03-12 LAB — CYTOLOGY CVX/VAG DOC THIN PREP: NORMAL

## 2024-12-18 ENCOUNTER — NON-APPOINTMENT (OUTPATIENT)
Age: 24
End: 2024-12-18

## 2024-12-18 ENCOUNTER — APPOINTMENT (OUTPATIENT)
Dept: DERMATOLOGY | Facility: CLINIC | Age: 24
End: 2024-12-18
Payer: SELF-PAY

## 2024-12-18 VITALS — BODY MASS INDEX: 22.2 KG/M2 | HEIGHT: 64 IN | WEIGHT: 130 LBS

## 2024-12-18 DIAGNOSIS — Z41.1 ENCOUNTER FOR COSMETIC SURGERY: ICD-10-CM

## 2024-12-18 PROCEDURE — D0090: CPT

## 2025-01-30 ENCOUNTER — APPOINTMENT (OUTPATIENT)
Dept: OBGYN | Facility: CLINIC | Age: 25
End: 2025-01-30

## 2025-01-30 ENCOUNTER — APPOINTMENT (OUTPATIENT)
Dept: OBGYN | Facility: CLINIC | Age: 25
End: 2025-01-30
Payer: COMMERCIAL

## 2025-01-30 VITALS
SYSTOLIC BLOOD PRESSURE: 108 MMHG | BODY MASS INDEX: 23.09 KG/M2 | WEIGHT: 135.25 LBS | HEIGHT: 64 IN | DIASTOLIC BLOOD PRESSURE: 60 MMHG

## 2025-01-30 DIAGNOSIS — N64.4 MASTODYNIA: ICD-10-CM

## 2025-01-30 PROCEDURE — 99213 OFFICE O/P EST LOW 20 MIN: CPT

## 2025-01-30 PROCEDURE — 99203 OFFICE O/P NEW LOW 30 MIN: CPT

## 2025-02-07 ENCOUNTER — APPOINTMENT (OUTPATIENT)
Dept: MAMMOGRAPHY | Facility: CLINIC | Age: 25
End: 2025-02-07
Payer: COMMERCIAL

## 2025-02-07 ENCOUNTER — OUTPATIENT (OUTPATIENT)
Dept: OUTPATIENT SERVICES | Facility: HOSPITAL | Age: 25
LOS: 1 days | End: 2025-02-07
Payer: COMMERCIAL

## 2025-02-07 ENCOUNTER — RESULT REVIEW (OUTPATIENT)
Age: 25
End: 2025-02-07

## 2025-02-07 ENCOUNTER — APPOINTMENT (OUTPATIENT)
Dept: ULTRASOUND IMAGING | Facility: CLINIC | Age: 25
End: 2025-02-07
Payer: COMMERCIAL

## 2025-02-07 DIAGNOSIS — N64.4 MASTODYNIA: ICD-10-CM

## 2025-02-07 PROCEDURE — 76641 ULTRASOUND BREAST COMPLETE: CPT

## 2025-02-07 PROCEDURE — 76641 ULTRASOUND BREAST COMPLETE: CPT | Mod: 26,LT

## 2025-02-10 ENCOUNTER — TRANSCRIPTION ENCOUNTER (OUTPATIENT)
Age: 25
End: 2025-02-10

## 2025-02-11 ENCOUNTER — TRANSCRIPTION ENCOUNTER (OUTPATIENT)
Age: 25
End: 2025-02-11

## 2025-02-14 ENCOUNTER — TRANSCRIPTION ENCOUNTER (OUTPATIENT)
Age: 25
End: 2025-02-14

## 2025-03-21 ENCOUNTER — APPOINTMENT (OUTPATIENT)
Dept: OBGYN | Facility: CLINIC | Age: 25
End: 2025-03-21

## 2025-04-28 RX ORDER — ALBUTEROL SULFATE 2.5 MG/3ML
(2.5 MG/3ML) SOLUTION RESPIRATORY (INHALATION)
Qty: 1 | Refills: 2 | Status: ACTIVE | COMMUNITY
Start: 2025-04-28 | End: 1900-01-01

## 2025-05-07 ENCOUNTER — NON-APPOINTMENT (OUTPATIENT)
Age: 25
End: 2025-05-07

## 2025-05-07 ENCOUNTER — APPOINTMENT (OUTPATIENT)
Dept: DERMATOLOGY | Facility: CLINIC | Age: 25
End: 2025-05-07
Payer: SELF-PAY

## 2025-05-07 DIAGNOSIS — Z41.1 ENCOUNTER FOR COSMETIC SURGERY: ICD-10-CM

## 2025-05-07 PROCEDURE — D0090: CPT

## 2025-06-11 ENCOUNTER — APPOINTMENT (OUTPATIENT)
Dept: OBGYN | Facility: CLINIC | Age: 25
End: 2025-06-11

## 2025-07-18 ENCOUNTER — APPOINTMENT (OUTPATIENT)
Dept: OBGYN | Facility: CLINIC | Age: 25
End: 2025-07-18

## 2025-07-18 VITALS
SYSTOLIC BLOOD PRESSURE: 116 MMHG | WEIGHT: 136 LBS | BODY MASS INDEX: 23.22 KG/M2 | HEIGHT: 64 IN | DIASTOLIC BLOOD PRESSURE: 70 MMHG

## 2025-07-18 PROBLEM — Z12.4 CERVICAL CANCER SCREENING: Status: ACTIVE | Noted: 2025-07-18

## 2025-07-18 LAB
HCG UR QL: NEGATIVE
QUALITY CONTROL: YES

## 2025-07-18 PROCEDURE — 99395 PREV VISIT EST AGE 18-39: CPT

## 2025-07-18 PROCEDURE — 81025 URINE PREGNANCY TEST: CPT

## 2025-07-18 PROCEDURE — 99459 PELVIC EXAMINATION: CPT | Mod: NC

## 2025-07-23 LAB — CYTOLOGY CVX/VAG DOC THIN PREP: NORMAL

## 2025-08-22 ENCOUNTER — OUTPATIENT (OUTPATIENT)
Dept: OUTPATIENT SERVICES | Facility: HOSPITAL | Age: 25
LOS: 1 days | End: 2025-08-22
Payer: COMMERCIAL

## 2025-08-22 ENCOUNTER — APPOINTMENT (OUTPATIENT)
Dept: ULTRASOUND IMAGING | Facility: CLINIC | Age: 25
End: 2025-08-22
Payer: COMMERCIAL

## 2025-08-22 ENCOUNTER — RESULT REVIEW (OUTPATIENT)
Age: 25
End: 2025-08-22

## 2025-08-22 DIAGNOSIS — N64.4 MASTODYNIA: ICD-10-CM

## 2025-08-22 PROCEDURE — 76642 ULTRASOUND BREAST LIMITED: CPT | Mod: 26,LT

## 2025-08-22 PROCEDURE — 76642 ULTRASOUND BREAST LIMITED: CPT

## 2025-08-25 ENCOUNTER — NON-APPOINTMENT (OUTPATIENT)
Age: 25
End: 2025-08-25

## 2025-08-25 DIAGNOSIS — N63.0 UNSPECIFIED LUMP IN UNSPECIFIED BREAST: ICD-10-CM

## 2025-09-02 ENCOUNTER — TRANSCRIPTION ENCOUNTER (OUTPATIENT)
Age: 25
End: 2025-09-02

## 2025-09-03 ENCOUNTER — TRANSCRIPTION ENCOUNTER (OUTPATIENT)
Age: 25
End: 2025-09-03